# Patient Record
Sex: FEMALE | Race: WHITE | NOT HISPANIC OR LATINO | Employment: OTHER | URBAN - NONMETROPOLITAN AREA
[De-identification: names, ages, dates, MRNs, and addresses within clinical notes are randomized per-mention and may not be internally consistent; named-entity substitution may affect disease eponyms.]

---

## 2017-01-01 ENCOUNTER — OFFICE VISIT (OUTPATIENT)
Dept: FAMILY MEDICINE CLINIC | Facility: CLINIC | Age: 82
End: 2017-01-01

## 2017-01-01 ENCOUNTER — LAB (OUTPATIENT)
Dept: LAB | Facility: OTHER | Age: 82
End: 2017-01-01

## 2017-01-01 ENCOUNTER — CLINICAL SUPPORT (OUTPATIENT)
Dept: FAMILY MEDICINE CLINIC | Facility: CLINIC | Age: 82
End: 2017-01-01

## 2017-01-01 ENCOUNTER — OFFICE VISIT (OUTPATIENT)
Dept: PODIATRY | Facility: CLINIC | Age: 82
End: 2017-01-01

## 2017-01-01 VITALS — HEIGHT: 57 IN | WEIGHT: 75 LBS | BODY MASS INDEX: 16.18 KG/M2

## 2017-01-01 VITALS
HEIGHT: 57 IN | TEMPERATURE: 97.9 F | HEART RATE: 80 BPM | WEIGHT: 72 LBS | DIASTOLIC BLOOD PRESSURE: 60 MMHG | BODY MASS INDEX: 15.53 KG/M2 | SYSTOLIC BLOOD PRESSURE: 110 MMHG

## 2017-01-01 VITALS
HEIGHT: 57 IN | BODY MASS INDEX: 15.71 KG/M2 | SYSTOLIC BLOOD PRESSURE: 96 MMHG | HEART RATE: 88 BPM | TEMPERATURE: 97 F | WEIGHT: 72.8 LBS | DIASTOLIC BLOOD PRESSURE: 60 MMHG

## 2017-01-01 VITALS
TEMPERATURE: 97.6 F | DIASTOLIC BLOOD PRESSURE: 70 MMHG | BODY MASS INDEX: 15.53 KG/M2 | HEIGHT: 57 IN | WEIGHT: 72 LBS | SYSTOLIC BLOOD PRESSURE: 108 MMHG | HEART RATE: 72 BPM

## 2017-01-01 DIAGNOSIS — D50.9 IRON DEFICIENCY ANEMIA, UNSPECIFIED IRON DEFICIENCY ANEMIA TYPE: Primary | Chronic | ICD-10-CM

## 2017-01-01 DIAGNOSIS — E55.9 VITAMIN D DEFICIENCY: ICD-10-CM

## 2017-01-01 DIAGNOSIS — Z23 FLU VACCINE NEED: ICD-10-CM

## 2017-01-01 DIAGNOSIS — L60.2 ONYCHOGRYPHOSIS: ICD-10-CM

## 2017-01-01 DIAGNOSIS — D50.9 IRON DEFICIENCY ANEMIA, UNSPECIFIED IRON DEFICIENCY ANEMIA TYPE: Chronic | ICD-10-CM

## 2017-01-01 DIAGNOSIS — E03.9 ACQUIRED HYPOTHYROIDISM: Chronic | ICD-10-CM

## 2017-01-01 DIAGNOSIS — M81.0 OSTEOPOROSIS, UNSPECIFIED OSTEOPOROSIS TYPE, UNSPECIFIED PATHOLOGICAL FRACTURE PRESENCE: Primary | Chronic | ICD-10-CM

## 2017-01-01 DIAGNOSIS — M15.9 PRIMARY OSTEOARTHRITIS INVOLVING MULTIPLE JOINTS: Chronic | ICD-10-CM

## 2017-01-01 DIAGNOSIS — I10 ESSENTIAL HYPERTENSION: ICD-10-CM

## 2017-01-01 DIAGNOSIS — L82.1 SK (SEBORRHEIC KERATOSIS): ICD-10-CM

## 2017-01-01 DIAGNOSIS — D50.9 IRON DEFICIENCY ANEMIA, UNSPECIFIED IRON DEFICIENCY ANEMIA TYPE: ICD-10-CM

## 2017-01-01 DIAGNOSIS — D50.9 IRON DEFICIENCY ANEMIA, UNSPECIFIED IRON DEFICIENCY ANEMIA TYPE: Primary | ICD-10-CM

## 2017-01-01 DIAGNOSIS — I10 ESSENTIAL HYPERTENSION: Chronic | ICD-10-CM

## 2017-01-01 DIAGNOSIS — D51.9 ANEMIA DUE TO VITAMIN B12 DEFICIENCY, UNSPECIFIED B12 DEFICIENCY TYPE: Chronic | ICD-10-CM

## 2017-01-01 DIAGNOSIS — I35.0 AORTIC VALVE STENOSIS, ETIOLOGY OF CARDIAC VALVE DISEASE UNSPECIFIED: Chronic | ICD-10-CM

## 2017-01-01 DIAGNOSIS — M81.0 OSTEOPOROSIS, UNSPECIFIED OSTEOPOROSIS TYPE, UNSPECIFIED PATHOLOGICAL FRACTURE PRESENCE: Chronic | ICD-10-CM

## 2017-01-01 DIAGNOSIS — I70.91 GENERALIZED ATHEROSCLEROSIS: Chronic | ICD-10-CM

## 2017-01-01 DIAGNOSIS — R23.3 SPONTANEOUS HEMATOMA OF FOREARM: Primary | ICD-10-CM

## 2017-01-01 DIAGNOSIS — M20.12 HALLUX VALGUS OF LEFT FOOT: ICD-10-CM

## 2017-01-01 DIAGNOSIS — B35.1 ONYCHOMYCOSIS: Primary | ICD-10-CM

## 2017-01-01 DIAGNOSIS — E03.9 ACQUIRED HYPOTHYROIDISM: Primary | Chronic | ICD-10-CM

## 2017-01-01 DIAGNOSIS — M20.42 HAMMER TOE OF LEFT FOOT: ICD-10-CM

## 2017-01-01 DIAGNOSIS — I10 ESSENTIAL HYPERTENSION: Primary | Chronic | ICD-10-CM

## 2017-01-01 DIAGNOSIS — D51.9 ANEMIA DUE TO VITAMIN B12 DEFICIENCY, UNSPECIFIED B12 DEFICIENCY TYPE: ICD-10-CM

## 2017-01-01 DIAGNOSIS — E55.9 VITAMIN D DEFICIENCY: Chronic | ICD-10-CM

## 2017-01-01 DIAGNOSIS — M79.674 PAIN OF TOE OF RIGHT FOOT: ICD-10-CM

## 2017-01-01 DIAGNOSIS — Z78.9: ICD-10-CM

## 2017-01-01 DIAGNOSIS — N18.30 CHRONIC KIDNEY DISEASE, STAGE 3 (HCC): Chronic | ICD-10-CM

## 2017-01-01 LAB
25(OH)D3 SERPL-MCNC: 50.3 NG/ML (ref 30–100)
ALBUMIN SERPL-MCNC: 3.7 G/DL (ref 3.2–5.5)
ALBUMIN/GLOB SERPL: 1 G/DL (ref 1–3)
ALP SERPL-CCNC: 46 U/L (ref 15–121)
ALT SERPL W P-5'-P-CCNC: 10 U/L (ref 10–60)
ANION GAP SERPL CALCULATED.3IONS-SCNC: 8 MMOL/L (ref 5–15)
AST SERPL-CCNC: 18 U/L (ref 10–60)
BASOPHILS # BLD AUTO: 0.03 10*3/MM3 (ref 0–0.2)
BASOPHILS NFR BLD AUTO: 0.4 % (ref 0–2)
BILIRUB SERPL-MCNC: 0.4 MG/DL (ref 0.2–1)
BUN BLD-MCNC: 30 MG/DL (ref 8–25)
BUN/CREAT SERPL: 20 (ref 7–25)
CALCIUM SPEC-SCNC: 9.9 MG/DL (ref 8.4–10.8)
CHLORIDE SERPL-SCNC: 104 MMOL/L (ref 100–112)
CHOLEST SERPL-MCNC: 179 MG/DL (ref 150–200)
CO2 SERPL-SCNC: 30 MMOL/L (ref 20–32)
CREAT BLD-MCNC: 1.5 MG/DL (ref 0.4–1.3)
DEPRECATED RDW RBC AUTO: 43.7 FL (ref 36.4–46.3)
EOSINOPHIL # BLD AUTO: 0.16 10*3/MM3 (ref 0–0.7)
EOSINOPHIL NFR BLD AUTO: 2.3 % (ref 0–7)
ERYTHROCYTE [DISTWIDTH] IN BLOOD BY AUTOMATED COUNT: 13 % (ref 11.5–14.5)
FERRITIN SERPL-MCNC: 140 NG/ML (ref 11.1–264)
GFR SERPL CREATININE-BSD FRML MDRD: 32 ML/MIN/1.73 (ref 39–90)
GLOBULIN UR ELPH-MCNC: 3.7 GM/DL (ref 2.5–4.6)
GLUCOSE BLD-MCNC: 101 MG/DL (ref 70–100)
HCT VFR BLD AUTO: 34.2 % (ref 35–45)
HDLC SERPL-MCNC: 55 MG/DL (ref 35–100)
HGB BLD-MCNC: 10.8 G/DL (ref 12–15.5)
IRON 24H UR-MRATE: 29 MCG/DL (ref 37–170)
IRON 24H UR-MRATE: 30 MCG/DL (ref 37–170)
IRON SATN MFR SERPL: 12 % (ref 15–50)
LDLC SERPL CALC-MCNC: 111 MG/DL
LDLC/HDLC SERPL: 2.03 {RATIO}
LYMPHOCYTES # BLD AUTO: 1.58 10*3/MM3 (ref 0.6–4.2)
LYMPHOCYTES NFR BLD AUTO: 22.3 % (ref 10–50)
MCH RBC QN AUTO: 30.4 PG (ref 26.5–34)
MCHC RBC AUTO-ENTMCNC: 31.6 G/DL (ref 31.4–36)
MCV RBC AUTO: 96.3 FL (ref 80–98)
MONOCYTES # BLD AUTO: 0.44 10*3/MM3 (ref 0–0.9)
MONOCYTES NFR BLD AUTO: 6.2 % (ref 0–12)
NEUTROPHILS # BLD AUTO: 4.88 10*3/MM3 (ref 2–8.6)
NEUTROPHILS NFR BLD AUTO: 68.8 % (ref 37–80)
PLATELET # BLD AUTO: 154 10*3/MM3 (ref 150–450)
PMV BLD AUTO: 9.2 FL (ref 8–12)
POTASSIUM BLD-SCNC: 5.3 MMOL/L (ref 3.4–5.4)
PROT SERPL-MCNC: 7.4 G/DL (ref 6.7–8.2)
RBC # BLD AUTO: 3.55 10*6/MM3 (ref 3.77–5.16)
SODIUM BLD-SCNC: 142 MMOL/L (ref 134–146)
T4 FREE SERPL-MCNC: 1.55 NG/DL (ref 0.78–2.19)
TIBC SERPL-MCNC: 237 MCG/DL (ref 265–497)
TRIGL SERPL-MCNC: 63 MG/DL (ref 35–160)
TSH SERPL DL<=0.05 MIU/L-ACNC: 0.35 MIU/ML (ref 0.46–4.68)
VIT B12 BLD-MCNC: 905 PG/ML (ref 239–931)
VLDLC SERPL-MCNC: 12.6 MG/DL
WBC NRBC COR # BLD: 7.09 10*3/MM3 (ref 3.2–9.8)

## 2017-01-01 PROCEDURE — 99202 OFFICE O/P NEW SF 15 MIN: CPT | Performed by: PODIATRIST

## 2017-01-01 PROCEDURE — 82306 VITAMIN D 25 HYDROXY: CPT | Performed by: INTERNAL MEDICINE

## 2017-01-01 PROCEDURE — 17000 DESTRUCT PREMALG LESION: CPT | Performed by: INTERNAL MEDICINE

## 2017-01-01 PROCEDURE — 82607 VITAMIN B-12: CPT | Performed by: INTERNAL MEDICINE

## 2017-01-01 PROCEDURE — 99213 OFFICE O/P EST LOW 20 MIN: CPT | Performed by: INTERNAL MEDICINE

## 2017-01-01 PROCEDURE — 82728 ASSAY OF FERRITIN: CPT | Performed by: INTERNAL MEDICINE

## 2017-01-01 PROCEDURE — 36415 COLL VENOUS BLD VENIPUNCTURE: CPT | Performed by: INTERNAL MEDICINE

## 2017-01-01 PROCEDURE — 84443 ASSAY THYROID STIM HORMONE: CPT | Performed by: INTERNAL MEDICINE

## 2017-01-01 PROCEDURE — 80061 LIPID PANEL: CPT | Performed by: INTERNAL MEDICINE

## 2017-01-01 PROCEDURE — 90662 IIV NO PRSV INCREASED AG IM: CPT | Performed by: INTERNAL MEDICINE

## 2017-01-01 PROCEDURE — 85025 COMPLETE CBC W/AUTO DIFF WBC: CPT | Performed by: INTERNAL MEDICINE

## 2017-01-01 PROCEDURE — 96372 THER/PROPH/DIAG INJ SC/IM: CPT | Performed by: INTERNAL MEDICINE

## 2017-01-01 PROCEDURE — 84439 ASSAY OF FREE THYROXINE: CPT | Performed by: INTERNAL MEDICINE

## 2017-01-01 PROCEDURE — 11720 DEBRIDE NAIL 1-5: CPT | Performed by: PODIATRIST

## 2017-01-01 PROCEDURE — 80053 COMPREHEN METABOLIC PANEL: CPT | Performed by: INTERNAL MEDICINE

## 2017-01-01 PROCEDURE — 83540 ASSAY OF IRON: CPT | Performed by: INTERNAL MEDICINE

## 2017-01-01 PROCEDURE — 99214 OFFICE O/P EST MOD 30 MIN: CPT | Performed by: INTERNAL MEDICINE

## 2017-01-01 PROCEDURE — G0008 ADMIN INFLUENZA VIRUS VAC: HCPCS | Performed by: INTERNAL MEDICINE

## 2017-01-01 RX ORDER — FENTANYL 25 UG/H
1 PATCH TRANSDERMAL
Qty: 10 PATCH | Refills: 0 | Status: SHIPPED | OUTPATIENT
Start: 2017-01-01 | End: 2017-01-01 | Stop reason: SDUPTHER

## 2017-01-01 RX ORDER — MIRABEGRON 25 MG/1
TABLET, FILM COATED, EXTENDED RELEASE ORAL
Qty: 90 TABLET | Refills: 3 | Status: SHIPPED | OUTPATIENT
Start: 2017-01-01

## 2017-01-01 RX ORDER — FENTANYL 25 UG/H
1 PATCH TRANSDERMAL
Qty: 10 PATCH | Refills: 0 | Status: SHIPPED | OUTPATIENT
Start: 2017-01-01 | End: 2018-01-01 | Stop reason: SDUPTHER

## 2017-01-01 RX ORDER — LEVOTHYROXINE SODIUM 0.1 MG/1
100 TABLET ORAL DAILY
Qty: 90 TABLET | Refills: 3 | Status: SHIPPED | OUTPATIENT
Start: 2017-01-01

## 2017-01-20 RX ORDER — FENTANYL 25 UG/H
1 PATCH TRANSDERMAL
Qty: 10 PATCH | Refills: 0 | Status: SHIPPED | OUTPATIENT
Start: 2017-01-20 | End: 2017-02-24 | Stop reason: SDUPTHER

## 2017-02-06 RX ORDER — FUROSEMIDE 40 MG/1
40 TABLET ORAL EVERY MORNING
Qty: 90 TABLET | Refills: 3 | Status: SHIPPED | OUTPATIENT
Start: 2017-02-06 | End: 2018-01-01 | Stop reason: SDUPTHER

## 2017-02-24 RX ORDER — FENTANYL 25 UG/H
1 PATCH TRANSDERMAL
Qty: 10 PATCH | Refills: 0 | Status: SHIPPED | OUTPATIENT
Start: 2017-02-24 | End: 2017-03-24 | Stop reason: SDUPTHER

## 2017-03-24 RX ORDER — FENTANYL 25 UG/H
1 PATCH TRANSDERMAL
Qty: 10 PATCH | Refills: 0 | Status: SHIPPED | OUTPATIENT
Start: 2017-03-24 | End: 2017-05-01 | Stop reason: SDUPTHER

## 2017-04-05 ENCOUNTER — CLINICAL SUPPORT (OUTPATIENT)
Dept: FAMILY MEDICINE CLINIC | Facility: CLINIC | Age: 82
End: 2017-04-05

## 2017-04-05 ENCOUNTER — LAB (OUTPATIENT)
Dept: LAB | Facility: OTHER | Age: 82
End: 2017-04-05

## 2017-04-05 DIAGNOSIS — I10 ESSENTIAL HYPERTENSION: ICD-10-CM

## 2017-04-05 DIAGNOSIS — M81.0 OSTEOPOROSIS: Primary | ICD-10-CM

## 2017-04-05 DIAGNOSIS — D50.9 IRON DEFICIENCY ANEMIA, UNSPECIFIED IRON DEFICIENCY ANEMIA TYPE: ICD-10-CM

## 2017-04-05 DIAGNOSIS — D51.9 ANEMIA DUE TO VITAMIN B12 DEFICIENCY, UNSPECIFIED B12 DEFICIENCY TYPE: ICD-10-CM

## 2017-04-05 DIAGNOSIS — E55.9 VITAMIN D DEFICIENCY: ICD-10-CM

## 2017-04-05 LAB
ALBUMIN SERPL-MCNC: 4 G/DL (ref 3.2–5.5)
ALBUMIN/GLOB SERPL: 1.2 G/DL (ref 1–3)
ALP SERPL-CCNC: 47 U/L (ref 15–121)
ALT SERPL W P-5'-P-CCNC: 11 U/L (ref 10–60)
ANION GAP SERPL CALCULATED.3IONS-SCNC: 10 MMOL/L (ref 5–15)
ANISOCYTOSIS BLD QL: NORMAL
AST SERPL-CCNC: 22 U/L (ref 10–60)
BILIRUB SERPL-MCNC: 0.4 MG/DL (ref 0.2–1)
BUN BLD-MCNC: 33 MG/DL (ref 8–25)
BUN/CREAT SERPL: 22 (ref 7–25)
CALCIUM SPEC-SCNC: 9.5 MG/DL (ref 8.4–10.8)
CHLORIDE SERPL-SCNC: 102 MMOL/L (ref 100–112)
CO2 SERPL-SCNC: 29 MMOL/L (ref 20–32)
CREAT BLD-MCNC: 1.5 MG/DL (ref 0.4–1.3)
DEPRECATED RDW RBC AUTO: 43.3 FL (ref 36.4–46.3)
EOSINOPHIL # BLD MANUAL: 0.26 10*3/MM3 (ref 0–0.7)
EOSINOPHIL NFR BLD MANUAL: 3 % (ref 0–7)
ERYTHROCYTE [DISTWIDTH] IN BLOOD BY AUTOMATED COUNT: 12.4 % (ref 11.5–14.5)
GFR SERPL CREATININE-BSD FRML MDRD: 32 ML/MIN/1.73 (ref 39–90)
GLOBULIN UR ELPH-MCNC: 3.4 GM/DL (ref 2.5–4.6)
GLUCOSE BLD-MCNC: 99 MG/DL (ref 70–100)
HCT VFR BLD AUTO: 36.6 % (ref 35–45)
HGB BLD-MCNC: 11.7 G/DL (ref 12–15.5)
LYMPHOCYTES # BLD MANUAL: 1.53 10*3/MM3 (ref 0.6–4.2)
LYMPHOCYTES NFR BLD MANUAL: 18 % (ref 10–50)
LYMPHOCYTES NFR BLD MANUAL: 5 % (ref 0–12)
MCH RBC QN AUTO: 31.4 PG (ref 26.5–34)
MCHC RBC AUTO-ENTMCNC: 32 G/DL (ref 31.4–36)
MCV RBC AUTO: 98.1 FL (ref 80–98)
MONOCYTES # BLD AUTO: 0.43 10*3/MM3 (ref 0–0.9)
NEUTROPHILS # BLD AUTO: 6.3 10*3/MM3 (ref 2–8.6)
NEUTROPHILS NFR BLD MANUAL: 72 % (ref 37–80)
NEUTS BAND NFR BLD MANUAL: 2 % (ref 0–5)
OVALOCYTES BLD QL SMEAR: NORMAL
PLATELET # BLD AUTO: 163 10*3/MM3 (ref 150–450)
PMV BLD AUTO: 9.8 FL (ref 8–12)
POTASSIUM BLD-SCNC: 4.4 MMOL/L (ref 3.4–5.4)
PROT SERPL-MCNC: 7.4 G/DL (ref 6.7–8.2)
RBC # BLD AUTO: 3.73 10*6/MM3 (ref 3.77–5.16)
SMALL PLATELETS BLD QL SMEAR: ADEQUATE
SODIUM BLD-SCNC: 141 MMOL/L (ref 134–146)
WBC MORPH BLD: NORMAL
WBC NRBC COR # BLD: 8.51 10*3/MM3 (ref 3.2–9.8)

## 2017-04-05 PROCEDURE — 85025 COMPLETE CBC W/AUTO DIFF WBC: CPT | Performed by: INTERNAL MEDICINE

## 2017-04-05 PROCEDURE — 80053 COMPREHEN METABOLIC PANEL: CPT | Performed by: INTERNAL MEDICINE

## 2017-04-05 PROCEDURE — 36415 COLL VENOUS BLD VENIPUNCTURE: CPT | Performed by: INTERNAL MEDICINE

## 2017-04-05 PROCEDURE — 82728 ASSAY OF FERRITIN: CPT | Performed by: INTERNAL MEDICINE

## 2017-04-05 PROCEDURE — 82607 VITAMIN B-12: CPT | Performed by: INTERNAL MEDICINE

## 2017-04-05 PROCEDURE — 96372 THER/PROPH/DIAG INJ SC/IM: CPT | Performed by: INTERNAL MEDICINE

## 2017-04-05 PROCEDURE — 82306 VITAMIN D 25 HYDROXY: CPT | Performed by: INTERNAL MEDICINE

## 2017-04-06 LAB
25(OH)D3 SERPL-MCNC: 51.4 NG/ML (ref 30–100)
FERRITIN SERPL-MCNC: 150 NG/ML (ref 11.1–264)
VIT B12 BLD-MCNC: >1000 PG/ML (ref 239–931)

## 2017-04-12 ENCOUNTER — OFFICE VISIT (OUTPATIENT)
Dept: FAMILY MEDICINE CLINIC | Facility: CLINIC | Age: 82
End: 2017-04-12

## 2017-04-12 VITALS
HEART RATE: 64 BPM | SYSTOLIC BLOOD PRESSURE: 102 MMHG | BODY MASS INDEX: 16.18 KG/M2 | TEMPERATURE: 97.8 F | DIASTOLIC BLOOD PRESSURE: 68 MMHG | WEIGHT: 75 LBS | HEIGHT: 57 IN

## 2017-04-12 DIAGNOSIS — I10 ESSENTIAL HYPERTENSION: Primary | ICD-10-CM

## 2017-04-12 DIAGNOSIS — N18.30 CHRONIC KIDNEY DISEASE, STAGE 3 (HCC): ICD-10-CM

## 2017-04-12 DIAGNOSIS — E03.9 ACQUIRED HYPOTHYROIDISM: ICD-10-CM

## 2017-04-12 DIAGNOSIS — I35.0 AORTIC VALVE STENOSIS, UNSPECIFIED ETIOLOGY: ICD-10-CM

## 2017-04-12 DIAGNOSIS — D50.9 IRON DEFICIENCY ANEMIA, UNSPECIFIED IRON DEFICIENCY ANEMIA TYPE: ICD-10-CM

## 2017-04-12 DIAGNOSIS — D51.9 ANEMIA DUE TO VITAMIN B12 DEFICIENCY, UNSPECIFIED B12 DEFICIENCY TYPE: ICD-10-CM

## 2017-04-12 DIAGNOSIS — E55.9 VITAMIN D DEFICIENCY: ICD-10-CM

## 2017-04-12 PROCEDURE — 99214 OFFICE O/P EST MOD 30 MIN: CPT | Performed by: INTERNAL MEDICINE

## 2017-04-12 NOTE — PROGRESS NOTES
Subjective        History of Present Illness     Teresa Reynolds is a 89 y.o. female here for a 6-month follow up on hypothyroidism, CKD, hypertension, osteoporosis, vitamin B-12 deficiency anemia, and COPD/senile emphysema, which responded to Albuterol nebulizer treatments. She continues on Prolia for osteoporosis, for which she had her last approved injection last week.  Her last DEXA was 03/2016.  She continues on Duragesic patch for pain control.      She was seen by Dr. Wheeler, optometrist, as well as ophthalmologist for her macular degeneration.  She reports her vision is somewhat improved.      She has a skin lesion to the right cheek consistent with a seborrheic keratosis.   With her thin, aging skin, I suggested we monitor and If she starts to develop signs of infection, we will need to address this further.     Tamica experienced a rather lengthy hospital stay last fall due to apparent diverticulitis.  She required transfusion of 2 units packed red blood cells.  Hemoglobin is 11.7 decreased from 14.1.  Ferritin is 140 with current labs obtained last week.     Her weight is stable from six months ago.  I had recommended nutritional supplements six months ago.  She hasn't been using the supplements, but feels her appetite is adequate.  She was eating and drinking poorly with hospitalization last fall and received a 2 week course of Megace to stimulate appetite.  She had also taken it in the past, but didn't like the taste.  Nutritional proteins have improved with this set of labs.      Blood pressure and heart rate are below goal today.   Family has not been checking the blood pressure at home.   I have asked them to monitor blood pressure and heart rate at home.  If they start to notice increasing edema, they can resume the 40 mg dose of      The patient's relevant past medical, surgical, and social history was reviewed in Epic.   Lab results are reviewed with the patient today.  Fasting glucose 99.  Vitamin  "B-12 is greater than 1000 with oral supplement three times weekly.  Vitamin D is 51.4.  Renal function is stable with creatinine 1.5.  Liver function normal.      Review of Systems   Constitutional: Negative for chills, fatigue and fever.   HENT: Negative for congestion, ear pain, postnasal drip, sinus pressure and sore throat.    Respiratory: Negative for cough, shortness of breath and wheezing.    Cardiovascular: Negative for chest pain, palpitations and leg swelling.   Gastrointestinal: Negative for abdominal pain, blood in stool, constipation, diarrhea, nausea and vomiting.   Endocrine: Negative for cold intolerance, heat intolerance, polydipsia and polyuria.   Genitourinary: Negative for dysuria, frequency, hematuria and urgency.   Skin: Negative for rash.   Neurological: Negative for syncope and weakness.        Vitals:    04/12/17 1300   BP: 102/68   Pulse: 64   Temp: 97.8 °F (36.6 °C)   TempSrc: Oral   Weight: 75 lb (34 kg)   Height: 57\" (144.8 cm)       Objective     Physical Exam   Constitutional: She is oriented to person, place, and time. She appears well-developed and well-nourished. No distress.   Frail-appearing.  In a wheelchair.  Accompanied by son and daughter-in-law.   HENT:   Head: Normocephalic and atraumatic.   Nose: Right sinus exhibits no maxillary sinus tenderness and no frontal sinus tenderness. Left sinus exhibits no maxillary sinus tenderness and no frontal sinus tenderness.   Mouth/Throat: Uvula is midline, oropharynx is clear and moist and mucous membranes are normal. No oral lesions. No tonsillar exudate.   Eyes: Conjunctivae and EOM are normal. Pupils are equal, round, and reactive to light.   Neck: Trachea normal. Neck supple. No JVD present. Carotid bruit is not present. No tracheal deviation present. No thyroid mass and no thyromegaly present.   Cardiovascular: Normal rate and regular rhythm.   No extrasystoles are present. PMI is not displaced.    Murmur heard.  Chronic murmur of " aortic valve stenosis.      Pulmonary/Chest: Effort normal and breath sounds normal. No accessory muscle usage. No respiratory distress. She has no decreased breath sounds. She has no wheezes. She has no rhonchi. She has no rales.   Chronic lung sounds.    Abdominal: Soft. Bowel sounds are normal. She exhibits no distension. There is no hepatosplenomegaly. There is no tenderness.   Musculoskeletal:   Severe kyphotic curvature primarily of the thoracic spine.  Arthritic changes in joints of the hands and fingers.           Vascular Status -  Her exam exhibits right foot vasculature normal. Her exam exhibits no right foot edema. Her exam exhibits left foot vasculature normal. Her exam exhibits no left foot edema.  Lymphadenopathy:     She has no cervical adenopathy.   Neurological: She is alert and oriented to person, place, and time. No cranial nerve deficit. Coordination normal.   Skin: Skin is warm, dry and intact. No rash noted. No cyanosis. Nails show no clubbing.   Psychiatric: She has a normal mood and affect. Her speech is normal and behavior is normal. Thought content normal.   Vitals reviewed.         Assessment/Plan      Her blood pressure and heart rate are slightly below goal.  I will have her decrease Lasix to 20 mg daily and decrease atenolol to 25 mg daily.  If family starts to notice increasing edema, she can resume the 40 mg dose of Lasix.  I asked the family to monitor blood pressure and heart rate at home.        We will continue to monitor the seborrheic keratosis to the right cheek.      Decrease vitamin B-12 to twice weekly.      She will be due for repeat DEXA 03/2018.  I will see her back in 6 months, at which time we will schedule additional Prolia injections.  He knew the calcium and vitamin D supplements.    Scribed for Dr. Leiva by [facility].     Diagnoses and all orders for this visit:    Essential hypertension    Aortic valve stenosis, unspecified etiology    Anemia due to  vitamin B12 deficiency, unspecified B12 deficiency type    Vitamin D deficiency    Acquired hypothyroidism    Chronic kidney disease, stage 3    Iron deficiency anemia, unspecified iron deficiency anemia type        Lab on 04/05/2017   Component Date Value Ref Range Status   • WBC 04/05/2017 8.51  3.20 - 9.80 10*3/mm3 Final   • RBC 04/05/2017 3.73* 3.77 - 5.16 10*6/mm3 Final   • Hemoglobin 04/05/2017 11.7* 12.0 - 15.5 g/dL Final   • Hematocrit 04/05/2017 36.6  35.0 - 45.0 % Final   • MCV 04/05/2017 98.1* 80.0 - 98.0 fL Final   • MCH 04/05/2017 31.4  26.5 - 34.0 pg Final   • MCHC 04/05/2017 32.0  31.4 - 36.0 g/dL Final   • RDW 04/05/2017 12.4  11.5 - 14.5 % Final   • RDW-SD 04/05/2017 43.3  36.4 - 46.3 fl Final   • MPV 04/05/2017 9.8  8.0 - 12.0 fL Final   • Platelets 04/05/2017 163  150 - 450 10*3/mm3 Final   • Glucose 04/05/2017 99  70 - 100 mg/dL Final   • BUN 04/05/2017 33* 8 - 25 mg/dL Final   • Creatinine 04/05/2017 1.50* 0.40 - 1.30 mg/dL Final   • Sodium 04/05/2017 141  134 - 146 mmol/L Final   • Potassium 04/05/2017 4.4  3.4 - 5.4 mmol/L Final   • Chloride 04/05/2017 102  100 - 112 mmol/L Final   • CO2 04/05/2017 29.0  20.0 - 32.0 mmol/L Final   • Calcium 04/05/2017 9.5  8.4 - 10.8 mg/dL Final   • Total Protein 04/05/2017 7.4  6.7 - 8.2 g/dL Final   • Albumin 04/05/2017 4.00  3.20 - 5.50 g/dL Final   • ALT (SGPT) 04/05/2017 11  10 - 60 U/L Final   • AST (SGOT) 04/05/2017 22  10 - 60 U/L Final   • Alkaline Phosphatase 04/05/2017 47  15 - 121 U/L Final   • Total Bilirubin 04/05/2017 0.4  0.2 - 1.0 mg/dL Final   • eGFR Non African Amer 04/05/2017 32* 39 - 90 mL/min/1.73 Final   • Globulin 04/05/2017 3.4  2.5 - 4.6 gm/dL Final   • A/G Ratio 04/05/2017 1.2  1.0 - 3.0 g/dL Final   • BUN/Creatinine Ratio 04/05/2017 22.0  7.0 - 25.0 Final   • Anion Gap 04/05/2017 10.0  5.0 - 15.0 mmol/L Final   • Vitamin B-12 04/05/2017 >1000* 239 - 931 pg/mL Final   • 25 Hydroxy, Vitamin D 04/05/2017 51.4  30.0 - 100.0 ng/ml Final    • Ferritin 04/05/2017 150.00  11.10 - 264.00 ng/mL Final   • Neutrophil % 04/05/2017 72.0  37.0 - 80.0 % Final   • Lymphocyte % 04/05/2017 18.0  10.0 - 50.0 % Final   • Monocyte % 04/05/2017 5.0  0.0 - 12.0 % Final   • Eosinophil % 04/05/2017 3.0  0.0 - 7.0 % Final   • Bands %  04/05/2017 2.0  0.0 - 5.0 % Final   • Neutrophils Absolute 04/05/2017 6.30  2.00 - 8.60 10*3/mm3 Final   • Lymphocytes Absolute 04/05/2017 1.53  0.60 - 4.20 10*3/mm3 Final   • Monocytes Absolute 04/05/2017 0.43  0.00 - 0.90 10*3/mm3 Final   • Eosinophils Absolute 04/05/2017 0.26  0.00 - 0.70 10*3/mm3 Final   • Anisocytosis 04/05/2017 Mod/2+  None Seen Final   • Ovalocytes 04/05/2017 Slight/1+  None Seen Final   • WBC Morphology 04/05/2017 Normal  Normal Final   • Platelet Estimate 04/05/2017 Adequate  Normal Final   ]

## 2017-05-02 RX ORDER — FENTANYL 25 UG/H
1 PATCH TRANSDERMAL
Qty: 10 PATCH | Refills: 0 | Status: SHIPPED | OUTPATIENT
Start: 2017-05-02 | End: 2017-01-01 | Stop reason: SDUPTHER

## 2017-08-29 NOTE — PROGRESS NOTES
"Subjective     Teresa Reynolds is a 89 y.o. female.     History of Present Illness   Tamica comes in today with complaint of an acute subcutaneous hematoma of the right forearm.  She woke this morning and just remembers touching the right forearm with her left hand.  She denies any fall or trauma.  She experienced sudden pain and subcutaneous swelling of the right mid forearm.  She has a hematoma now approximately the size of a large plum, slightly smaller than a tennis ball.  It appears that the acute bleeding has stopped.  The subcutaneous blood is extending from the elbow to the wrist.  She was experiencing some acute pain when this occurred this morning, but the acute pain has resolved.  We discussed management options.  She takes baby aspirin daily.    Review of Systems   Constitutional: Negative for chills, fatigue and fever.   HENT: Negative for congestion, ear pain, postnasal drip, sinus pressure and sore throat.    Respiratory: Negative for cough, shortness of breath and wheezing.    Cardiovascular: Negative for chest pain, palpitations and leg swelling.   Gastrointestinal: Negative for abdominal pain, blood in stool, constipation, diarrhea, nausea and vomiting.   Endocrine: Negative for cold intolerance, heat intolerance, polydipsia and polyuria.   Genitourinary: Negative for dysuria, frequency, hematuria and urgency.   Skin: Positive for color change. Negative for rash and wound.   Neurological: Negative for syncope and weakness.   Hematological: Bruises/bleeds easily.       Objective     BP 96/60  Pulse 88  Temp 97 °F (36.1 °C) (Oral)   Ht 57\" (144.8 cm)  Wt 72 lb 12.8 oz (33 kg) Comment: estimated  BMI 15.75 kg/m2    Physical Exam   Constitutional: She is oriented to person, place, and time. She appears well-developed and well-nourished. No distress.   Frail-appearing.  In a wheelchair.  Accompanied by her sister, Mireille AYERST:   Head: Normocephalic and atraumatic.   Nose: Right sinus exhibits no " maxillary sinus tenderness and no frontal sinus tenderness. Left sinus exhibits no maxillary sinus tenderness and no frontal sinus tenderness.   Mouth/Throat: Uvula is midline, oropharynx is clear and moist and mucous membranes are normal. No oral lesions. No tonsillar exudate.   Eyes: Conjunctivae and EOM are normal. Pupils are equal, round, and reactive to light.   Neck: Trachea normal. Neck supple. No JVD present. Carotid bruit is not present. No tracheal deviation present. No thyroid mass and no thyromegaly present.   Cardiovascular: Normal rate and regular rhythm.   No extrasystoles are present. PMI is not displaced.    Murmur heard.  Chronic murmur of aortic valve stenosis.      Pulmonary/Chest: Effort normal and breath sounds normal. No accessory muscle usage. No respiratory distress. She has no decreased breath sounds. She has no wheezes. She has no rhonchi. She has no rales.   Chronic lung sounds.    Abdominal: Soft. Bowel sounds are normal. She exhibits no distension. There is no hepatosplenomegaly. There is no tenderness.   Musculoskeletal:   Severe kyphotic curvature primarily of the thoracic spine.  Arthritic changes in joints of the hands and fingers.           Vascular Status -  Her exam exhibits right foot vasculature normal. Her exam exhibits no right foot edema. Her exam exhibits left foot vasculature normal. Her exam exhibits no left foot edema.  Lymphadenopathy:     She has no cervical adenopathy.   Neurological: She is alert and oriented to person, place, and time. No cranial nerve deficit. Coordination normal.   Skin: Skin is warm, dry and intact. No rash noted. No cyanosis. Nails show no clubbing.   A 4 cm subcutaneous hematoma is noted on the right forearm.  There is some subcutaneous bleeding extending from the elbow to the wrist.  No current active bleeding or increase in the hematoma size is noted.  No breaks in the skin.   Psychiatric: She has a normal mood and affect. Her speech is  normal and behavior is normal. Thought content normal.   Vitals reviewed.      PHQ-2/PHQ-9 Depression Screening 8/29/2017   Little interest or pleasure in doing things 0   Feeling down, depressed, or hopeless 0   Total Score 0       Assessment/Plan   Stop the daily aspirin for now.  We may need to reassess daily aspirin therapy risks and benefits.    We applied a very light pressure wrap on the right forearm.  Reassurance is given.  I will see her back on Thursday morning to make sure that no additional intervention will be necessary.    Continue her current dose of atenolol and Lasix, but watch for orthostatic symptoms.  Fall precautions.  Diagnoses and all orders for this visit:    Spontaneous hematoma of forearm    Essential hypertension    Aspirin administered within prior 24 hours      No visits with results within 3 Week(s) from this visit.  Latest known visit with results is:    Lab on 04/05/2017   Component Date Value Ref Range Status   • WBC 04/05/2017 8.51  3.20 - 9.80 10*3/mm3 Final   • RBC 04/05/2017 3.73* 3.77 - 5.16 10*6/mm3 Final   • Hemoglobin 04/05/2017 11.7* 12.0 - 15.5 g/dL Final   • Hematocrit 04/05/2017 36.6  35.0 - 45.0 % Final   • MCV 04/05/2017 98.1* 80.0 - 98.0 fL Final   • MCH 04/05/2017 31.4  26.5 - 34.0 pg Final   • MCHC 04/05/2017 32.0  31.4 - 36.0 g/dL Final   • RDW 04/05/2017 12.4  11.5 - 14.5 % Final   • RDW-SD 04/05/2017 43.3  36.4 - 46.3 fl Final   • MPV 04/05/2017 9.8  8.0 - 12.0 fL Final   • Platelets 04/05/2017 163  150 - 450 10*3/mm3 Final   • Glucose 04/05/2017 99  70 - 100 mg/dL Final   • BUN 04/05/2017 33* 8 - 25 mg/dL Final   • Creatinine 04/05/2017 1.50* 0.40 - 1.30 mg/dL Final   • Sodium 04/05/2017 141  134 - 146 mmol/L Final   • Potassium 04/05/2017 4.4  3.4 - 5.4 mmol/L Final   • Chloride 04/05/2017 102  100 - 112 mmol/L Final   • CO2 04/05/2017 29.0  20.0 - 32.0 mmol/L Final   • Calcium 04/05/2017 9.5  8.4 - 10.8 mg/dL Final   • Total Protein 04/05/2017 7.4  6.7 - 8.2  g/dL Final   • Albumin 04/05/2017 4.00  3.20 - 5.50 g/dL Final   • ALT (SGPT) 04/05/2017 11  10 - 60 U/L Final   • AST (SGOT) 04/05/2017 22  10 - 60 U/L Final   • Alkaline Phosphatase 04/05/2017 47  15 - 121 U/L Final   • Total Bilirubin 04/05/2017 0.4  0.2 - 1.0 mg/dL Final   • eGFR Non African Amer 04/05/2017 32* 39 - 90 mL/min/1.73 Final   • Globulin 04/05/2017 3.4  2.5 - 4.6 gm/dL Final   • A/G Ratio 04/05/2017 1.2  1.0 - 3.0 g/dL Final   • BUN/Creatinine Ratio 04/05/2017 22.0  7.0 - 25.0 Final   • Anion Gap 04/05/2017 10.0  5.0 - 15.0 mmol/L Final   • Vitamin B-12 04/05/2017 >1000* 239 - 931 pg/mL Final   • 25 Hydroxy, Vitamin D 04/05/2017 51.4  30.0 - 100.0 ng/ml Final   • Ferritin 04/05/2017 150.00  11.10 - 264.00 ng/mL Final   • Neutrophil % 04/05/2017 72.0  37.0 - 80.0 % Final   • Lymphocyte % 04/05/2017 18.0  10.0 - 50.0 % Final   • Monocyte % 04/05/2017 5.0  0.0 - 12.0 % Final   • Eosinophil % 04/05/2017 3.0  0.0 - 7.0 % Final   • Bands %  04/05/2017 2.0  0.0 - 5.0 % Final   • Neutrophils Absolute 04/05/2017 6.30  2.00 - 8.60 10*3/mm3 Final   • Lymphocytes Absolute 04/05/2017 1.53  0.60 - 4.20 10*3/mm3 Final   • Monocytes Absolute 04/05/2017 0.43  0.00 - 0.90 10*3/mm3 Final   • Eosinophils Absolute 04/05/2017 0.26  0.00 - 0.70 10*3/mm3 Final   • Anisocytosis 04/05/2017 Mod/2+  None Seen Final   • Ovalocytes 04/05/2017 Slight/1+  None Seen Final   • WBC Morphology 04/05/2017 Normal  Normal Final   • Platelet Estimate 04/05/2017 Adequate  Normal Final   ]

## 2017-10-25 NOTE — PROGRESS NOTES
Subjective        History of Present Illness     Teresa Reynolds is a 89 y.o. female here for a 6-month follow up on hypothyroidism, CKD, hypertension, osteoporosis, vitamin B-12 deficiency anemia, and COPD/senile emphysema. She continues on Prolia for osteoporosis.  Her last DEXA was 03/2016.  She continues on q.o.d. aspirin.    She continues on Duragesic patch for pain control due to chronic back pain.  She also takes Tylenol 600 mg as well.  She denies significant side effects.  She had an episode of constipation a couple of weeks ago, which resolved with stool softeners.  She denies excessive sedation.   She is given a refill on the Duragesic.  Patient understands the risks associated with this controlled medication, including tolerance and addiction.          She has thickening of the right great toenail on the right foot.  I recommended referral to Dr. Middleton, podiatrist, for possible toenail removal.  We discussed benefit of using Braxton's salve for any development of future ingrowing nails.  She reports she already uses this.      She has a skin lesion to the right cheek consistent with a seborrheic keratosis.   She is frequently picking at the area.  We used liquid nitrogen today to destroy the AK, being careful due to her thin, aging skin.       Ar her visit, her lasix dose was decreased to 20 mg, but family reports they increased back to the 40 mg dose.         Tamica experienced a rather lengthy hospital stay last fall due to apparent diverticulitis.  She required transfusion of 2 units packed red blood cells.  Hemoglobin is 10.8 decreased from 11.7 six months ago and 14.1 after the transfusion.  Ferritin is 140 with current labs obtained last week.  She is not currently taking an iron supplement.  However, she did have a significant blood loss with a large acute spontaneous subcutaneous hematoma of the right forearm, which was addressed by Dr. Peraza last month.  This appears to be healing well other than  "some dry skin, for which I recommended applying Aquaphor.         Weight is down 3 pounds in the past six months, but stable over the past two months.      Potassium is trending up.  She does admit to eating 1/2 banana daily and also tangerines.  We will continue to monitor potassium.      The patient's relevant past medical, surgical, and social history was reviewed in Epic.   Lab results are reviewed with the patient today. CBC reveals evidence of anemia with hemoglobin 10.8 and hematocrit 34.2. Fasting glucose 101.  Vitamin B-12 and vitamin D at goal.  Total cholesterol 179.  HDL 55.  .  Triglycerides 63.  Renal function stable with creatinine 1.5.  Nutritional proteins adequate.         Review of Systems   Constitutional: Negative for chills, fatigue and fever.   HENT: Negative for congestion, ear pain, postnasal drip, sinus pressure and sore throat.    Respiratory: Negative for cough, shortness of breath and wheezing.    Cardiovascular: Negative for chest pain, palpitations and leg swelling.   Gastrointestinal: Negative for abdominal pain, blood in stool, constipation, diarrhea, nausea and vomiting.   Endocrine: Negative for cold intolerance, heat intolerance, polydipsia and polyuria.   Genitourinary: Negative for dysuria, frequency, hematuria and urgency.   Skin: Negative for rash.   Neurological: Negative for syncope and weakness.        Objective     Vitals:    10/25/17 1436   BP: 110/60   Pulse: 80   Temp: 97.9 °F (36.6 °C)   TempSrc: Oral   Weight: 72 lb (32.7 kg)   Height: 57\" (144.8 cm)     Physical Exam   Constitutional: She is oriented to person, place, and time. She appears well-developed and well-nourished. No distress.   Frail-appearing.  In a wheelchair.  Accompanied by son and daughter-in-law.    HENT:   Head: Normocephalic and atraumatic.   Nose: Right sinus exhibits no maxillary sinus tenderness and no frontal sinus tenderness. Left sinus exhibits no maxillary sinus tenderness and no " frontal sinus tenderness.   Mouth/Throat: Uvula is midline, oropharynx is clear and moist and mucous membranes are normal. No oral lesions. No tonsillar exudate.   Eyes: Conjunctivae and EOM are normal. Pupils are equal, round, and reactive to light.   Neck: Trachea normal. Neck supple. No JVD present. Carotid bruit is not present. No tracheal deviation present. No thyroid mass and no thyromegaly present.   Cardiovascular: Normal rate, regular rhythm and normal heart sounds.   No extrasystoles are present. PMI is not displaced.    No murmur heard.  Chronic murmur of aortic valve stenosis.   Pulmonary/Chest: Effort normal and breath sounds normal. No accessory muscle usage. No respiratory distress. She has no decreased breath sounds. She has no wheezes. She has no rhonchi. She has no rales.   Abdominal: Soft. Bowel sounds are normal. She exhibits no distension. There is no hepatosplenomegaly. There is no tenderness.       Vascular Status -  Her exam exhibits right foot vasculature normal. Her exam exhibits no right foot edema. Her exam exhibits left foot vasculature normal. Her exam exhibits no left foot edema.  Lymphadenopathy:     She has no cervical adenopathy.   Neurological: She is alert and oriented to person, place, and time. No cranial nerve deficit. Coordination normal.   Skin: Skin is warm, dry and intact. No rash noted. No cyanosis. Nails show no clubbing.   Actinic keratosis noted to right cheek.    Psychiatric: She has a normal mood and affect. Her speech is normal and behavior is normal. Thought content normal.   Vitals reviewed.      Assessment/Plan      Continue with Prolia injections every 6 months.  Continue the calcium/vitamin D supplement.  She just had her Prolia injection today.  She received her flu vaccination today as well.  She will due repeat DEXA 03/2018.       Continue on the Lasix 40 mg 1/2-1 tablet daily and atenolol 50 mg one half daily.  We will continue to monitor potassium.  She may  continue to have one half banana daily, but avoid another potassium-rich foods.     Continue vitamin B-12 twice weekly.  We will continue to monitor hemoglobin/anemia.  She took some oral iron previously with fairly good results, but she had difficulty tolerating it.    Refer to Dr. Middleton, podiatrist, for nail care to address the thickened, painful nail on the right great toe.      Cryotherapy, Skin Lesion  Date/Time: 10/25/2017 3:00 PM  Performed by: INGE LEIVA  Authorized by: INGE LEIVA   Local anesthesia used: no    Anesthesia:  Local anesthesia used: no    Sedation:  Patient sedated: no  Patient tolerance: Patient tolerated the procedure well with no immediate complications      After informed consent, liquid nitrogen is used to destroy the actinic keratosis on the right cheek without difficulty or complications.  She tolerated well.      She is given a refill on fentanyl (Duragesic) 25 mcg/hr patch to place 1 patch on the skin Every 72 (Seventy-Two) Hours for chronic back pain.     Patient understands the risks associated with this controlled medication, including tolerance and addiction.  She also agrees to only obtain this medication from me, and not from a another provider, unless that provider is covering for me in my absence.  She also agrees to be compliant in dosing, and not self adjust the dose of medication.  A signed controlled substance agreement is on file, and she has received a controlled substance education sheet at this or a previous visit.  She has also signed a consent for treatment with a controlled substance as per [facility] policy. TAMMY was obtained.    Scribed for Dr. Leiva by [facility].       Diagnoses and all orders for this visit:    Acquired hypothyroidism  -     TSH; Future  -     T4, free; Future    Aortic valve stenosis, etiology of cardiac valve disease unspecified    Essential hypertension  -     Comprehensive Metabolic Panel; Future  -     CBC Auto  Differential; Future  -     Ferritin; Future  -     Vitamin B12; Future  -     Iron Profile; Future  -     Vitamin D 25 Hydroxy; Future  -     TSH; Future  -     T4, free; Future    Generalized atherosclerosis    Anemia due to vitamin B12 deficiency, unspecified B12 deficiency type  -     Vitamin B12; Future    Osteoporosis, unspecified osteoporosis type, unspecified pathological fracture presence    Primary osteoarthritis involving multiple joints    Vitamin D deficiency  -     Vitamin D 25 Hydroxy; Future    Chronic kidney disease, stage 3  -     Comprehensive Metabolic Panel; Future  -     CBC Auto Differential; Future  -     Ferritin; Future  -     Vitamin B12; Future  -     Iron Profile; Future  -     Vitamin D 25 Hydroxy; Future  -     TSH; Future  -     T4, free; Future    Iron deficiency anemia, unspecified iron deficiency anemia type  -     Comprehensive Metabolic Panel; Future  -     CBC Auto Differential; Future  -     Ferritin; Future  -     Vitamin B12; Future  -     Iron Profile; Future  -     Vitamin D 25 Hydroxy; Future  -     TSH; Future  -     T4, free; Future    Onychogryphosis  -     Ambulatory Referral to Podiatry    SK (seborrheic keratosis)  -     Cryotherapy, Skin Lesion    Other orders  -     fentaNYL (DURAGESIC) 25 MCG/HR patch; Place 1 patch on the skin Every 72 (Seventy-Two) Hours.        Orders Only on 10/19/2017   Component Date Value Ref Range Status   • Iron 10/18/2017 29* 37 - 170 mcg/dL Final   • TIBC 10/18/2017 237* 265 - 497 mcg/dL Final   • Iron Saturation 10/18/2017 12* 15 - 50 % Final   Lab on 10/18/2017   Component Date Value Ref Range Status   • Glucose 10/18/2017 101* 70 - 100 mg/dL Final   • BUN 10/18/2017 30* 8 - 25 mg/dL Final   • Creatinine 10/18/2017 1.50* 0.40 - 1.30 mg/dL Final   • Sodium 10/18/2017 142  134 - 146 mmol/L Final   • Potassium 10/18/2017 5.3  3.4 - 5.4 mmol/L Final   • Chloride 10/18/2017 104  100 - 112 mmol/L Final   • CO2 10/18/2017 30.0  20.0 - 32.0  mmol/L Final   • Calcium 10/18/2017 9.9  8.4 - 10.8 mg/dL Final   • Total Protein 10/18/2017 7.4  6.7 - 8.2 g/dL Final   • Albumin 10/18/2017 3.70  3.20 - 5.50 g/dL Final   • ALT (SGPT) 10/18/2017 10  10 - 60 U/L Final   • AST (SGOT) 10/18/2017 18  10 - 60 U/L Final   • Alkaline Phosphatase 10/18/2017 46  15 - 121 U/L Final   • Total Bilirubin 10/18/2017 0.4  0.2 - 1.0 mg/dL Final   • eGFR Non  Amer 10/18/2017 32* 39 - 90 mL/min/1.73 Final   • Globulin 10/18/2017 3.7  2.5 - 4.6 gm/dL Final   • A/G Ratio 10/18/2017 1.0  1.0 - 3.0 g/dL Final   • BUN/Creatinine Ratio 10/18/2017 20.0  7.0 - 25.0 Final   • Anion Gap 10/18/2017 8.0  5.0 - 15.0 mmol/L Final   • Total Cholesterol 10/18/2017 179  150 - 200 mg/dL Final   • Triglycerides 10/18/2017 63  35 - 160 mg/dL Final   • HDL Cholesterol 10/18/2017 55  35 - 100 mg/dL Final   • LDL Cholesterol  10/18/2017 111  mg/dL Final   • VLDL Cholesterol 10/18/2017 12.6  mg/dL Final   • LDL/HDL Ratio 10/18/2017 2.03   Final   • TSH 10/18/2017 0.350* 0.460 - 4.680 mIU/mL Final   • Free T4 10/18/2017 1.55  0.78 - 2.19 ng/dL Final   • 25 Hydroxy, Vitamin D 10/18/2017 50.3  30.0 - 100.0 ng/ml Final   • Vitamin B-12 10/18/2017 905  239 - 931 pg/mL Final   • WBC 10/18/2017 7.09  3.20 - 9.80 10*3/mm3 Final   • RBC 10/18/2017 3.55* 3.77 - 5.16 10*6/mm3 Final   • Hemoglobin 10/18/2017 10.8* 12.0 - 15.5 g/dL Final   • Hematocrit 10/18/2017 34.2* 35.0 - 45.0 % Final   • MCV 10/18/2017 96.3  80.0 - 98.0 fL Final   • MCH 10/18/2017 30.4  26.5 - 34.0 pg Final   • MCHC 10/18/2017 31.6  31.4 - 36.0 g/dL Final   • RDW 10/18/2017 13.0  11.5 - 14.5 % Final   • RDW-SD 10/18/2017 43.7  36.4 - 46.3 fl Final   • MPV 10/18/2017 9.2  8.0 - 12.0 fL Final   • Platelets 10/18/2017 154  150 - 450 10*3/mm3 Final   • Neutrophil % 10/18/2017 68.8  37.0 - 80.0 % Final   • Lymphocyte % 10/18/2017 22.3  10.0 - 50.0 % Final   • Monocyte % 10/18/2017 6.2  0.0 - 12.0 % Final   • Eosinophil % 10/18/2017 2.3  0.0 -  7.0 % Final   • Basophil % 10/18/2017 0.4  0.0 - 2.0 % Final   • Neutrophils, Absolute 10/18/2017 4.88  2.00 - 8.60 10*3/mm3 Final   • Lymphocytes, Absolute 10/18/2017 1.58  0.60 - 4.20 10*3/mm3 Final   • Monocytes, Absolute 10/18/2017 0.44  0.00 - 0.90 10*3/mm3 Final   • Eosinophils, Absolute 10/18/2017 0.16  0.00 - 0.70 10*3/mm3 Final   • Basophils, Absolute 10/18/2017 0.03  0.00 - 0.20 10*3/mm3 Final   • Iron 10/18/2017 30* 37 - 170 mcg/dL Final   • Ferritin 10/18/2017 140.00  11.10 - 264.00 ng/mL Final   ]

## 2018-01-01 ENCOUNTER — TELEPHONE (OUTPATIENT)
Dept: FAMILY MEDICINE CLINIC | Facility: CLINIC | Age: 83
End: 2018-01-01

## 2018-01-01 RX ORDER — FENTANYL 25 UG/H
1 PATCH TRANSDERMAL
Qty: 10 PATCH | Refills: 0 | Status: SHIPPED | OUTPATIENT
Start: 2018-01-01 | End: 2018-01-01 | Stop reason: SDUPTHER

## 2018-01-01 RX ORDER — ALBUTEROL SULFATE 2.5 MG/3ML
2.5 SOLUTION RESPIRATORY (INHALATION)
Qty: 360 VIAL | Refills: 3 | Status: SHIPPED | OUTPATIENT
Start: 2018-01-01

## 2018-01-01 RX ORDER — FUROSEMIDE 40 MG/1
40 TABLET ORAL EVERY MORNING
Qty: 90 TABLET | Refills: 3 | Status: SHIPPED | OUTPATIENT
Start: 2018-01-01

## 2018-01-01 RX ORDER — LEVOFLOXACIN 500 MG/1
500 TABLET, FILM COATED ORAL DAILY
Qty: 7 TABLET | Refills: 0 | Status: SHIPPED | OUTPATIENT
Start: 2018-01-01 | End: 2018-01-01

## 2018-01-01 RX ORDER — ATENOLOL 25 MG/1
25 TABLET ORAL DAILY
Qty: 90 TABLET | Refills: 3 | Status: SHIPPED | OUTPATIENT
Start: 2018-01-01

## 2018-01-01 RX ORDER — FENTANYL 25 UG/H
1 PATCH TRANSDERMAL
Qty: 10 PATCH | Refills: 0 | Status: SHIPPED | OUTPATIENT
Start: 2018-01-01

## 2018-01-01 RX ORDER — PREDNISONE 10 MG/1
TABLET ORAL
Qty: 12 TABLET | Refills: 0 | Status: SHIPPED | OUTPATIENT
Start: 2018-01-01

## 2018-04-18 NOTE — TELEPHONE ENCOUNTER
Stephanie from [facility] called and states patient has productive cough with green mucus and O2 sats at 88%.  \Dr. Leiva ordered antibiotics and Prednisone. Instructed to go to ER if she becomes worse or call our office later this week. TP